# Patient Record
Sex: FEMALE | Race: WHITE
[De-identification: names, ages, dates, MRNs, and addresses within clinical notes are randomized per-mention and may not be internally consistent; named-entity substitution may affect disease eponyms.]

---

## 2021-01-18 ENCOUNTER — HOSPITAL ENCOUNTER (EMERGENCY)
Dept: HOSPITAL 26 - MED | Age: 51
Discharge: HOME | End: 2021-01-18
Payer: MEDICAID

## 2021-01-18 VITALS — BODY MASS INDEX: 31.58 KG/M2 | WEIGHT: 185 LBS | HEIGHT: 64 IN

## 2021-01-18 VITALS — DIASTOLIC BLOOD PRESSURE: 90 MMHG | SYSTOLIC BLOOD PRESSURE: 160 MMHG

## 2021-01-18 VITALS — SYSTOLIC BLOOD PRESSURE: 160 MMHG | DIASTOLIC BLOOD PRESSURE: 90 MMHG

## 2021-01-18 DIAGNOSIS — R31.9: ICD-10-CM

## 2021-01-18 DIAGNOSIS — N39.0: Primary | ICD-10-CM

## 2021-01-18 DIAGNOSIS — E11.9: ICD-10-CM

## 2021-01-18 NOTE — NUR
to bed 4, ambulatory with c/o hematuria x 1 week. HAS BEEN TAKING OTC 
MEDICATIONS BUT CONTINUES WITH C/O SUPRAPUBIC PAIN AND BLOOD IN URINE